# Patient Record
Sex: FEMALE | Race: OTHER | Employment: UNEMPLOYED
[De-identification: names, ages, dates, MRNs, and addresses within clinical notes are randomized per-mention and may not be internally consistent; named-entity substitution may affect disease eponyms.]

---

## 2024-03-14 ENCOUNTER — HOSPITAL ENCOUNTER (EMERGENCY)
Facility: HOSPITAL | Age: 29
Discharge: HOME | End: 2024-03-14

## 2024-03-14 ENCOUNTER — APPOINTMENT (OUTPATIENT)
Facility: HOSPITAL | Age: 29
End: 2024-03-14

## 2024-03-14 VITALS
WEIGHT: 127.87 LBS | HEART RATE: 86 BPM | RESPIRATION RATE: 16 BRPM | HEIGHT: 63 IN | DIASTOLIC BLOOD PRESSURE: 69 MMHG | TEMPERATURE: 98.2 F | BODY MASS INDEX: 22.66 KG/M2 | OXYGEN SATURATION: 100 % | SYSTOLIC BLOOD PRESSURE: 116 MMHG

## 2024-03-14 DIAGNOSIS — R73.9 HYPERGLYCEMIA: ICD-10-CM

## 2024-03-14 DIAGNOSIS — G43.819 OTHER MIGRAINE WITHOUT STATUS MIGRAINOSUS, INTRACTABLE: Primary | ICD-10-CM

## 2024-03-14 LAB
ALBUMIN SERPL-MCNC: 3.5 G/DL (ref 3.5–5)
ALBUMIN/GLOB SERPL: 1 (ref 1.1–2.2)
ALP SERPL-CCNC: 182 U/L (ref 45–117)
ALT SERPL-CCNC: 81 U/L (ref 12–78)
ANION GAP SERPL CALC-SCNC: 7 MMOL/L (ref 5–15)
AST SERPL W P-5'-P-CCNC: 22 U/L (ref 15–37)
BASOPHILS # BLD: 0 K/UL (ref 0–0.1)
BASOPHILS NFR BLD: 0 % (ref 0–1)
BILIRUB SERPL-MCNC: 0.7 MG/DL (ref 0.2–1)
BUN SERPL-MCNC: 12 MG/DL (ref 6–20)
BUN/CREAT SERPL: 16 (ref 12–20)
CA-I BLD-MCNC: 9.8 MG/DL (ref 8.5–10.1)
CHLORIDE SERPL-SCNC: 98 MMOL/L (ref 97–108)
CO2 SERPL-SCNC: 30 MMOL/L (ref 21–32)
CREAT SERPL-MCNC: 0.73 MG/DL (ref 0.55–1.02)
DIFFERENTIAL METHOD BLD: ABNORMAL
EKG ATRIAL RATE: 99 BPM
EKG DIAGNOSIS: NORMAL
EKG P AXIS: 76 DEGREES
EKG P-R INTERVAL: 138 MS
EKG Q-T INTERVAL: 334 MS
EKG QRS DURATION: 78 MS
EKG QTC CALCULATION (BAZETT): 428 MS
EKG R AXIS: 51 DEGREES
EKG T AXIS: 46 DEGREES
EKG VENTRICULAR RATE: 99 BPM
EOSINOPHIL # BLD: 0.1 K/UL (ref 0–0.4)
EOSINOPHIL NFR BLD: 1 % (ref 0–7)
ERYTHROCYTE [DISTWIDTH] IN BLOOD BY AUTOMATED COUNT: 11.7 % (ref 11.5–14.5)
GLOBULIN SER CALC-MCNC: 3.4 G/DL (ref 2–4)
GLUCOSE BLD STRIP.AUTO-MCNC: 416 MG/DL (ref 65–100)
GLUCOSE BLD STRIP.AUTO-MCNC: 470 MG/DL (ref 65–100)
GLUCOSE SERPL-MCNC: 513 MG/DL (ref 65–100)
HCT VFR BLD AUTO: 44.1 % (ref 35–47)
HGB BLD-MCNC: 16 G/DL (ref 11.5–16)
IMM GRANULOCYTES # BLD AUTO: 0 K/UL (ref 0–0.04)
IMM GRANULOCYTES NFR BLD AUTO: 0 % (ref 0–0.5)
INR PPP: 1 (ref 0.9–1.1)
LYMPHOCYTES # BLD: 3 K/UL (ref 0.8–3.5)
LYMPHOCYTES NFR BLD: 41 % (ref 12–49)
MCH RBC QN AUTO: 29.6 PG (ref 26–34)
MCHC RBC AUTO-ENTMCNC: 36.3 G/DL (ref 30–36.5)
MCV RBC AUTO: 81.5 FL (ref 80–99)
MONOCYTES # BLD: 0.3 K/UL (ref 0–1)
MONOCYTES NFR BLD: 4 % (ref 5–13)
NEUTS SEG # BLD: 4 K/UL (ref 1.8–8)
NEUTS SEG NFR BLD: 54 % (ref 32–75)
PERFORMED BY:: ABNORMAL
PERFORMED BY:: ABNORMAL
PLATELET # BLD AUTO: 273 K/UL (ref 150–400)
PMV BLD AUTO: 10.4 FL (ref 8.9–12.9)
POTASSIUM SERPL-SCNC: 4 MMOL/L (ref 3.5–5.1)
PROT SERPL-MCNC: 6.9 G/DL (ref 6.4–8.2)
PROTHROMBIN TIME: 9.8 SEC (ref 9–11.1)
RBC # BLD AUTO: 5.41 M/UL (ref 3.8–5.2)
SODIUM SERPL-SCNC: 135 MMOL/L (ref 136–145)
TROPONIN I SERPL HS-MCNC: <4 NG/L (ref 0–51)
WBC # BLD AUTO: 7.4 K/UL (ref 3.6–11)

## 2024-03-14 PROCEDURE — 85025 COMPLETE CBC W/AUTO DIFF WBC: CPT

## 2024-03-14 PROCEDURE — 85610 PROTHROMBIN TIME: CPT

## 2024-03-14 PROCEDURE — 70498 CT ANGIOGRAPHY NECK: CPT

## 2024-03-14 PROCEDURE — 96372 THER/PROPH/DIAG INJ SC/IM: CPT

## 2024-03-14 PROCEDURE — 70450 CT HEAD/BRAIN W/O DYE: CPT

## 2024-03-14 PROCEDURE — 84484 ASSAY OF TROPONIN QUANT: CPT

## 2024-03-14 PROCEDURE — 80053 COMPREHEN METABOLIC PANEL: CPT

## 2024-03-14 PROCEDURE — 6370000000 HC RX 637 (ALT 250 FOR IP)

## 2024-03-14 PROCEDURE — 96375 TX/PRO/DX INJ NEW DRUG ADDON: CPT

## 2024-03-14 PROCEDURE — 2580000003 HC RX 258

## 2024-03-14 PROCEDURE — 6360000004 HC RX CONTRAST MEDICATION

## 2024-03-14 PROCEDURE — 96374 THER/PROPH/DIAG INJ IV PUSH: CPT

## 2024-03-14 PROCEDURE — 6360000002 HC RX W HCPCS

## 2024-03-14 PROCEDURE — 93005 ELECTROCARDIOGRAM TRACING: CPT

## 2024-03-14 PROCEDURE — 36415 COLL VENOUS BLD VENIPUNCTURE: CPT

## 2024-03-14 PROCEDURE — 99285 EMERGENCY DEPT VISIT HI MDM: CPT

## 2024-03-14 PROCEDURE — 82962 GLUCOSE BLOOD TEST: CPT

## 2024-03-14 RX ORDER — KETOROLAC TROMETHAMINE 30 MG/ML
30 INJECTION, SOLUTION INTRAMUSCULAR; INTRAVENOUS ONCE
Status: COMPLETED | OUTPATIENT
Start: 2024-03-14 | End: 2024-03-14

## 2024-03-14 RX ORDER — DIPHENHYDRAMINE HCL 12.5MG/5ML
25 LIQUID (ML) ORAL
Status: COMPLETED | OUTPATIENT
Start: 2024-03-14 | End: 2024-03-14

## 2024-03-14 RX ORDER — METOCLOPRAMIDE HYDROCHLORIDE 5 MG/ML
5 INJECTION INTRAMUSCULAR; INTRAVENOUS ONCE
Status: COMPLETED | OUTPATIENT
Start: 2024-03-14 | End: 2024-03-14

## 2024-03-14 RX ORDER — 0.9 % SODIUM CHLORIDE 0.9 %
1000 INTRAVENOUS SOLUTION INTRAVENOUS ONCE
Status: COMPLETED | OUTPATIENT
Start: 2024-03-14 | End: 2024-03-14

## 2024-03-14 RX ORDER — DEXAMETHASONE SODIUM PHOSPHATE 10 MG/ML
10 INJECTION, SOLUTION INTRAMUSCULAR; INTRAVENOUS ONCE
Status: COMPLETED | OUTPATIENT
Start: 2024-03-14 | End: 2024-03-14

## 2024-03-14 RX ADMIN — DEXAMETHASONE SODIUM PHOSPHATE 10 MG: 10 INJECTION INTRAMUSCULAR; INTRAVENOUS at 17:56

## 2024-03-14 RX ADMIN — SODIUM CHLORIDE 1000 ML: 9 INJECTION, SOLUTION INTRAVENOUS at 18:01

## 2024-03-14 RX ADMIN — METOCLOPRAMIDE 5 MG: 5 INJECTION, SOLUTION INTRAMUSCULAR; INTRAVENOUS at 17:56

## 2024-03-14 RX ADMIN — IOPAMIDOL 100 ML: 755 INJECTION, SOLUTION INTRAVENOUS at 17:16

## 2024-03-14 RX ADMIN — KETOROLAC TROMETHAMINE 30 MG: 30 INJECTION, SOLUTION INTRAMUSCULAR at 17:57

## 2024-03-14 RX ADMIN — INSULIN HUMAN 10 UNITS: 100 INJECTION, SOLUTION PARENTERAL at 18:32

## 2024-03-14 RX ADMIN — DIPHENHYDRAMINE HYDROCHLORIDE 25 MG: 25 SOLUTION ORAL at 17:57

## 2024-03-14 ASSESSMENT — PAIN SCALES - GENERAL: PAINLEVEL_OUTOF10: 8

## 2024-03-14 ASSESSMENT — LIFESTYLE VARIABLES
HOW MANY STANDARD DRINKS CONTAINING ALCOHOL DO YOU HAVE ON A TYPICAL DAY: PATIENT DOES NOT DRINK
HOW OFTEN DO YOU HAVE A DRINK CONTAINING ALCOHOL: NEVER

## 2024-03-14 ASSESSMENT — PAIN - FUNCTIONAL ASSESSMENT: PAIN_FUNCTIONAL_ASSESSMENT: 0-10

## 2024-03-14 NOTE — DISCHARGE INSTRUCTIONS
[place] [address]. [phone]  Yesica Bah, DO Family Medicine  Kathryn Cancino NP Internal Medicine  Lali Cheung, GARRET Internal Medicine    [facility]: [address], [place] [address]. [phone]  Chago Galicia MD Internal Medicine  Shivam Barreto MD Internal Medicine    Primary Care [facility]: [address] [place] [address]. [phone]  Sally Ramos MD Family Medicine  Katheryn Fallon, MD Family Medicine  Dm Bowen MD Family Medicine  Sarahy Macario, GARRET Family Medicine  Chivo Cancino, ARPN, CNP Family Medicine    [facility]: [address], [address], [place] [address]. [phone]  Lilly Garnica MD Internal Medicine  Jacquie Adams, MD Internal Medicine  Ayad Farmer MD Internal Medicine  Gigi Flynn MD Internal Medicine  Jennifer Parish MD Internal Medicine  Kaylee Meade MD Internal Medicine  Kimber Dela Cruz, GARRET Internal Medicine    Primary Care [facility]: [address], [address], [place] [address]. [phone]  [place] Trotter MD Family Medicine  Halina Caceres MD Family Medicine  Jorge Cherry MD Family Medicine  Alycia Hung, PA Family Medicine  Corie Frank, GARRET Family Medicine  Esther Norman, GARRET Family Medicine  Dmitriy Tariq, GARRET Family Medicine    [facility]: [address], [address], [place] [address]. [phone]   Fariha Hinton MD Family Medicine   Margo Lawrence Family Medicine   Gabriela Figueredo MD Family Medicine   Azucena Miller, NP Family Medicine    Primary Care [facility]: [address], [place] [address]. [phone]  Eliel García MD Family Medicine  Bernie Fernandez MD Family Medicine  Ananya Hutcihson MD Family Medicine  Musa Mckeon MD Family Medicine  Chandni Oliveira, DO Family Medicine  Francis Heck MD Family Medicine  Samantha

## 2024-03-14 NOTE — ED PROVIDER NOTES
[facility] EMERGENCY DEPT  EMERGENCY DEPARTMENT HISTORY AND PHYSICAL EXAM      Date: 3/14/2024  Patient Name: Ida Llanes  MRN: [id number]  YOB: 1995  Date of evaluation: 3/14/2024  Provider: Aleah Rodriguez PA-C   Note Started: 4:57 PM EDT 3/14/24    HISTORY OF PRESENT ILLNESS     Chief Complaint   Patient presents with    Migraine       History Provided By: Patient    HPI: Ida Llanes is a 29 y.o. female with past medical history of insulin-dependent diabetes, presents for headache, right hand numbness, and episode of leg numbness.  Patient states that headache has been going on for 2 weeks, however worsened significantly over the last 2 days.  This morning, patient woke up and said \"it felt like my life is not attached my body\" and that \"it feels like anesthesia was inserted into my hand\".   was used.    PAST MEDICAL HISTORY   Past Medical History:  No past medical history on file.    Past Surgical History:  No past surgical history on file.    Family History:  No family history on file.    Social History:       Allergies:  No Known Allergies    PCP: None, None    Current Meds:   No current facility-administered medications for this encounter.     No current outpatient medications on file.       Social Determinants of Health:   Social Determinants of Health     Tobacco Use: Not on file   Alcohol Use: Not At Risk (3/14/2024)    AUDIT-C     Frequency of Alcohol Consumption: Never     Average Number of Drinks: Patient does not drink     Frequency of Binge Drinking: Never   Financial Resource Strain: Not on file   Food Insecurity: Not on file   Transportation Needs: Not on file   Physical Activity: Not on file   Stress: Not on file   Social Connections: Not on file   Intimate Partner Violence: Not on file   Depression: Not on file   Housing Stability: Not on file   Interpersonal Safety: Not on file   Utilities: Not on file       PHYSICAL EXAM   Physical

## 2024-03-14 NOTE — ED TRIAGE NOTES
Pt presents with right sided arm pain, right sided leg pain and migraines.  Pt reports she is a diabetic as well.  Pt reports she feels like she is having a fever, but she never has one.  Pt reports symptoms have been present for more than 2 weeks.

## 2024-04-05 ENCOUNTER — HOSPITAL ENCOUNTER (EMERGENCY)
Facility: HOSPITAL | Age: 29
Discharge: HOME | End: 2024-04-05
Attending: STUDENT IN AN ORGANIZED HEALTH CARE EDUCATION/TRAINING PROGRAM

## 2024-04-05 VITALS
HEART RATE: 77 BPM | OXYGEN SATURATION: 100 % | DIASTOLIC BLOOD PRESSURE: 94 MMHG | TEMPERATURE: 98.3 F | RESPIRATION RATE: 16 BRPM | SYSTOLIC BLOOD PRESSURE: 112 MMHG

## 2024-04-05 DIAGNOSIS — E10.9 TYPE 1 DIABETES MELLITUS WITHOUT COMPLICATION (HCC): Primary | ICD-10-CM

## 2024-04-05 DIAGNOSIS — R73.9 HYPERGLYCEMIA: ICD-10-CM

## 2024-04-05 LAB
ALBUMIN SERPL-MCNC: 3.1 G/DL (ref 3.5–5)
ALBUMIN/GLOB SERPL: 0.9 (ref 1.1–2.2)
ALP SERPL-CCNC: 159 U/L (ref 45–117)
ALT SERPL-CCNC: 57 U/L (ref 12–78)
ANION GAP SERPL CALC-SCNC: 10 MMOL/L (ref 5–15)
AST SERPL W P-5'-P-CCNC: 31 U/L (ref 15–37)
BASE DEFICIT BLD-SCNC: 3.2 MMOL/L
BASOPHILS # BLD: 0 K/UL (ref 0–0.1)
BASOPHILS NFR BLD: 1 % (ref 0–1)
BILIRUB SERPL-MCNC: 0.5 MG/DL (ref 0.2–1)
BUN SERPL-MCNC: 7 MG/DL (ref 6–20)
BUN/CREAT SERPL: 8 (ref 12–20)
CA-I BLD-MCNC: 1.17 MMOL/L (ref 1.12–1.32)
CA-I BLD-MCNC: 8.5 MG/DL (ref 8.5–10.1)
CHLORIDE BLD-SCNC: 101 MMOL/L (ref 98–107)
CHLORIDE SERPL-SCNC: 102 MMOL/L (ref 97–108)
CO2 BLD-SCNC: 22 MMOL/L
CO2 SERPL-SCNC: 17 MMOL/L (ref 21–32)
CREAT SERPL-MCNC: 0.87 MG/DL (ref 0.55–1.02)
CREAT UR-MCNC: 0.42 MG/DL (ref 0.6–1.3)
DIFFERENTIAL METHOD BLD: ABNORMAL
EOSINOPHIL # BLD: 0.1 K/UL (ref 0–0.4)
EOSINOPHIL NFR BLD: 1 % (ref 0–7)
ERYTHROCYTE [DISTWIDTH] IN BLOOD BY AUTOMATED COUNT: 12.1 % (ref 11.5–14.5)
GLOBULIN SER CALC-MCNC: 3.6 G/DL (ref 2–4)
GLUCOSE BLD STRIP.AUTO-MCNC: 268 MG/DL (ref 65–100)
GLUCOSE BLD STRIP.AUTO-MCNC: 566 MG/DL (ref 65–100)
GLUCOSE BLD STRIP.AUTO-MCNC: >600 MG/DL (ref 65–100)
GLUCOSE SERPL-MCNC: 653 MG/DL (ref 65–100)
HCG SERPL QL: NEGATIVE
HCO3 BLD-SCNC: 22.5 MMOL/L (ref 19–28)
HCT VFR BLD AUTO: 36.9 % (ref 35–47)
HGB BLD-MCNC: 13 G/DL (ref 11.5–16)
IMM GRANULOCYTES # BLD AUTO: 0 K/UL (ref 0–0.04)
IMM GRANULOCYTES NFR BLD AUTO: 1 % (ref 0–0.5)
LACTATE BLD-SCNC: 3.79 MMOL/L (ref 0.4–2)
LYMPHOCYTES # BLD: 2.2 K/UL (ref 0.8–3.5)
LYMPHOCYTES NFR BLD: 36 % (ref 12–49)
MCH RBC QN AUTO: 29.6 PG (ref 26–34)
MCHC RBC AUTO-ENTMCNC: 35.2 G/DL (ref 30–36.5)
MCV RBC AUTO: 84.1 FL (ref 80–99)
MONOCYTES # BLD: 0.3 K/UL (ref 0–1)
MONOCYTES NFR BLD: 5 % (ref 5–13)
NEUTS SEG # BLD: 3.5 K/UL (ref 1.8–8)
NEUTS SEG NFR BLD: 56 % (ref 32–75)
NRBC # BLD: 0 K/UL (ref 0–0.01)
NRBC BLD-RTO: 0 PER 100 WBC
PCO2 BLD: 41.5 MMHG (ref 35–45)
PERFORMED BY:: ABNORMAL
PH BLD: 7.34 (ref 7.35–7.45)
PLATELET # BLD AUTO: 215 K/UL (ref 150–400)
PMV BLD AUTO: 10.5 FL (ref 8.9–12.9)
PO2 BLD: <27 MMHG (ref 75–100)
POTASSIUM BLD-SCNC: 3.7 MMOL/L (ref 3.5–5.5)
POTASSIUM SERPL-SCNC: 4 MMOL/L (ref 3.5–5.1)
PROT SERPL-MCNC: 6.7 G/DL (ref 6.4–8.2)
RBC # BLD AUTO: 4.39 M/UL (ref 3.8–5.2)
SODIUM BLD-SCNC: 133 MMOL/L (ref 136–145)
SODIUM SERPL-SCNC: 129 MMOL/L (ref 136–145)
SPECIMEN SITE: ABNORMAL
WBC # BLD AUTO: 6.1 K/UL (ref 3.6–11)

## 2024-04-05 PROCEDURE — 82947 ASSAY GLUCOSE BLOOD QUANT: CPT

## 2024-04-05 PROCEDURE — 84703 CHORIONIC GONADOTROPIN ASSAY: CPT

## 2024-04-05 PROCEDURE — 84295 ASSAY OF SERUM SODIUM: CPT

## 2024-04-05 PROCEDURE — 96360 HYDRATION IV INFUSION INIT: CPT

## 2024-04-05 PROCEDURE — 84132 ASSAY OF SERUM POTASSIUM: CPT

## 2024-04-05 PROCEDURE — 2580000003 HC RX 258: Performed by: STUDENT IN AN ORGANIZED HEALTH CARE EDUCATION/TRAINING PROGRAM

## 2024-04-05 PROCEDURE — 83605 ASSAY OF LACTIC ACID: CPT

## 2024-04-05 PROCEDURE — 85025 COMPLETE CBC W/AUTO DIFF WBC: CPT

## 2024-04-05 PROCEDURE — 96361 HYDRATE IV INFUSION ADD-ON: CPT

## 2024-04-05 PROCEDURE — 82962 GLUCOSE BLOOD TEST: CPT

## 2024-04-05 PROCEDURE — 99284 EMERGENCY DEPT VISIT MOD MDM: CPT

## 2024-04-05 PROCEDURE — 80053 COMPREHEN METABOLIC PANEL: CPT

## 2024-04-05 PROCEDURE — 82803 BLOOD GASES ANY COMBINATION: CPT

## 2024-04-05 PROCEDURE — 82330 ASSAY OF CALCIUM: CPT

## 2024-04-05 PROCEDURE — 36415 COLL VENOUS BLD VENIPUNCTURE: CPT

## 2024-04-05 PROCEDURE — 6370000000 HC RX 637 (ALT 250 FOR IP): Performed by: STUDENT IN AN ORGANIZED HEALTH CARE EDUCATION/TRAINING PROGRAM

## 2024-04-05 RX ORDER — 0.9 % SODIUM CHLORIDE 0.9 %
1000 INTRAVENOUS SOLUTION INTRAVENOUS ONCE
Status: DISCONTINUED | OUTPATIENT
Start: 2024-04-05 | End: 2024-04-05 | Stop reason: HOSPADM

## 2024-04-05 RX ORDER — INSULIN GLARGINE 100 [IU]/ML
20 INJECTION, SOLUTION SUBCUTANEOUS DAILY
Qty: 6 ML | Refills: 0 | Status: SHIPPED | OUTPATIENT
Start: 2024-04-05 | End: 2024-05-05

## 2024-04-05 RX ORDER — CALCIUM CARBONATE 500(1250)
500 TABLET ORAL DAILY
COMMUNITY

## 2024-04-05 RX ORDER — ACETAMINOPHEN 500 MG
500 TABLET ORAL EVERY 6 HOURS PRN
COMMUNITY

## 2024-04-05 RX ORDER — 0.9 % SODIUM CHLORIDE 0.9 %
1000 INTRAVENOUS SOLUTION INTRAVENOUS ONCE
Status: COMPLETED | OUTPATIENT
Start: 2024-04-05 | End: 2024-04-05

## 2024-04-05 RX ORDER — M-VIT,TX,IRON,MINS/CALC/FOLIC 27MG-0.4MG
1 TABLET ORAL DAILY
COMMUNITY

## 2024-04-05 RX ORDER — POTASSIUM CHLORIDE 750 MG/1
40 TABLET, FILM COATED, EXTENDED RELEASE ORAL ONCE
Status: COMPLETED | OUTPATIENT
Start: 2024-04-05 | End: 2024-04-05

## 2024-04-05 RX ADMIN — POTASSIUM CHLORIDE 40 MEQ: 750 TABLET, EXTENDED RELEASE ORAL at 18:14

## 2024-04-05 RX ADMIN — INSULIN HUMAN 6 UNITS: 100 INJECTION, SOLUTION PARENTERAL at 18:47

## 2024-04-05 RX ADMIN — SODIUM CHLORIDE 1000 ML: 9 INJECTION, SOLUTION INTRAVENOUS at 17:31

## 2024-04-05 ASSESSMENT — LIFESTYLE VARIABLES
HOW OFTEN DO YOU HAVE A DRINK CONTAINING ALCOHOL: NEVER
HOW MANY STANDARD DRINKS CONTAINING ALCOHOL DO YOU HAVE ON A TYPICAL DAY: PATIENT DOES NOT DRINK

## 2024-04-05 ASSESSMENT — PAIN - FUNCTIONAL ASSESSMENT: PAIN_FUNCTIONAL_ASSESSMENT: NONE - DENIES PAIN

## 2024-04-05 NOTE — DISCHARGE INSTRUCTIONS
_______________________________________________________________________   [facility] ([place]-[facility] - [facility]  Servicios: atención primaria, atención especializada, pediatría, pruebas de VIH/ETS, ramakrishna de la omar, ramakrishna mental, atención dental, atención oftalmológica, administración de casos de farmacia.  Location: [address] [place] [id number]  Clinic phone: [phone]  Pharmacy line: [phone]  Phone lines are available Mon-Thurs 8:00 a.m.-5:00 p.m and Fridays 8:00 a.m.-12:30 p.m.  Clinic Hours  Monday-Thursday: 8:30 a.m.-5:00 p.m.  Friday: 8:30 a.m.-12:00 p.m.  [facility] Department  Call for assistance with social work  [phone]    _______________________________________________________________________  [facility] - [facility]  Services: medical, behavioral health  Hours:  Monday: 8:30am to 6:30pm  Tuesday - Friday: 8:30am to 5:00pm  Closed daily for lunch 12p - 1p  Closes at 12p on the 1st Wednesday of each month  Location: [address] [place] [address] (across from bus station)  [phone] (fax)  [facility]  Services: medical, dental, wellness workshops, behavioral health, addiction services  By appointment:  Cardiology  Rheumatology  [facility] Chronic Care  [facility] Mental Health  Women's Health / GYN  Mammograms and Mammogram Classes  Blood Pressure Screenings  General Health Screenings  Seasonal Flu Shots  Location: [address] [place], [place] [id number]  [phone]  _______________________________________________________________________              Thank you!  Thank you for allowing me to care for you in the emergency department. It is my goal to provide you with excellent care.  Please fill out the survey that will come to you by mail or email since we listen to your feedback!     Below you will find a list of your tests from today's visit.  Should you have any

## 2024-04-05 NOTE — ED TRIAGE NOTES
Pt arrives to ED by EMS from work. Pt , has not taken insulin due to possible financial limitations. Pt reportedly takes 28 units a day. EMS gave 100ml normal saline through established 20g RAC.     EMS also reports syncopal episode in parking lot.

## 2024-04-05 NOTE — ED PROVIDER NOTES
[facility] EMERGENCY DEPT  EMERGENCY DEPARTMENT HISTORY AND PHYSICAL EXAM      Date: 4/5/2024  Patient Name: Ida Llanes  MRN: [id number]  Birthdate 1995  Date of evaluation: 4/5/2024  Provider: Mj Medrano MD   Note Started: 4:32 PM EDT 4/5/24    HISTORY OF PRESENT ILLNESS     Chief Complaint   Patient presents with    Hyperglycemia       History Provided By: Patient    HPI: Ida Llanes is a 29 y.o. female history of diabetes presents with concern for hyperglycemia and multiple symptoms.  Patient [place], has lived in [place] for about 5 [place].   used for this visit.  Patient reports today while at work she began to feel fatigued, headaches, and sensation that her blood pressure was low.  She has been unable to afford her insulin therefore she is not taking it recently.  Patient denies any fevers, abdominal pain, vomiting, dysuria, hematuria, vaginal bleeding or vaginal discharge.    PAST MEDICAL HISTORY   Past Medical History:  No past medical history on file.    Past Surgical History:  No past surgical history on file.    Family History:  No family history on file.    Social History:       Allergies:  No Known Allergies    PCP: None, None    Current Meds:   Current Facility-Administered Medications   Medication Dose Route Frequency Provider Last Rate Last Admin    sodium chloride 0.9% bolus 1,000 mL  1,000 mL IntraVENous Once Mj Medrano MD         Current Outpatient Medications   Medication Sig Dispense Refill    acetaminophen (TYLENOL) 500 MG tablet Take 1 tablet by mouth every 6 hours as needed for Pain      Multiple Vitamins-Minerals (THERAPEUTIC MULTIVITAMIN-MINERALS) tablet Take 1 tablet by mouth daily      calcium carbonate (OSCAL) 500 MG TABS tablet Take 1 tablet by mouth daily         Social Determinants of Health:   Social Determinants of Health     Tobacco Use: Not on file   Alcohol Use: Not At Risk

## 2024-04-05 NOTE — ED TRIAGE NOTES
Patient has been out of insulin for 2 weeks. Takes lantus 20 units AM and 7 units pm. A lot of abd pain after eating and has to vomit, also has diarrhea. This has been going on for 1 year. FSBS has just read high